# Patient Record
Sex: MALE | Race: WHITE | NOT HISPANIC OR LATINO | Employment: UNEMPLOYED | URBAN - METROPOLITAN AREA
[De-identification: names, ages, dates, MRNs, and addresses within clinical notes are randomized per-mention and may not be internally consistent; named-entity substitution may affect disease eponyms.]

---

## 2020-10-13 ENCOUNTER — APPOINTMENT (OUTPATIENT)
Dept: CT IMAGING | Facility: HOSPITAL | Age: 45
End: 2020-10-13
Payer: COMMERCIAL

## 2020-10-13 ENCOUNTER — HOSPITAL ENCOUNTER (EMERGENCY)
Facility: HOSPITAL | Age: 45
Discharge: HOME | End: 2020-10-14
Attending: STUDENT IN AN ORGANIZED HEALTH CARE EDUCATION/TRAINING PROGRAM
Payer: COMMERCIAL

## 2020-10-13 VITALS
HEIGHT: 73 IN | TEMPERATURE: 98.6 F | RESPIRATION RATE: 18 BRPM | BODY MASS INDEX: 22.7 KG/M2 | SYSTOLIC BLOOD PRESSURE: 123 MMHG | WEIGHT: 171.3 LBS | OXYGEN SATURATION: 92 % | DIASTOLIC BLOOD PRESSURE: 71 MMHG | HEART RATE: 73 BPM

## 2020-10-13 DIAGNOSIS — R10.32 LEFT LOWER QUADRANT ABDOMINAL PAIN: Primary | ICD-10-CM

## 2020-10-13 LAB
ALBUMIN SERPL-MCNC: 4.7 G/DL (ref 3.5–5.3)
ALP SERPL-CCNC: 74 U/L (ref 45–115)
ALT SERPL-CCNC: 16 U/L (ref 7–52)
ANION GAP SERPL CALC-SCNC: 8 MMOL/L (ref 3–11)
AST SERPL-CCNC: 27 U/L
BACTERIA #/AREA URNS AUTO: NORMAL /HPF
BASOPHILS # BLD AUTO: 0.1 10*3/UL
BASOPHILS NFR BLD AUTO: 1 % (ref 0–2)
BILIRUB SERPL-MCNC: 0.36 MG/DL (ref 0.2–1.4)
BILIRUB UR QL STRIP.AUTO: NEGATIVE
BUN SERPL-MCNC: 22 MG/DL (ref 7–25)
CALCIUM ALBUM COR SERPL-MCNC: 9.4 MG/DL (ref 8.6–10.3)
CALCIUM SERPL-MCNC: 10 MG/DL (ref 8.6–10.3)
CHLORIDE SERPL-SCNC: 105 MMOL/L (ref 98–107)
CLARITY UR: CLEAR
CO2 SERPL-SCNC: 25 MMOL/L (ref 21–32)
COLOR UR: NORMAL
CREAT SERPL-MCNC: 0.83 MG/DL (ref 0.7–1.3)
EOSINOPHIL # BLD AUTO: 0.2 10*3/UL
EOSINOPHIL NFR BLD AUTO: 3 % (ref 0–3)
ERYTHROCYTE [DISTWIDTH] IN BLOOD BY AUTOMATED COUNT: 12.7 % (ref 11.5–15)
GFR SERPL CREATININE-BSD FRML MDRD: 106 ML/MIN/1.73M*2
GLUCOSE SERPL-MCNC: 88 MG/DL (ref 70–105)
GLUCOSE UR STRIP.AUTO-MCNC: NEGATIVE MG/DL
HCT VFR BLD AUTO: 42.6 % (ref 38–50)
HGB BLD-MCNC: 15.2 G/DL (ref 13.2–17.2)
HGB UR QL STRIP.AUTO: NEGATIVE
KETONES UR STRIP.AUTO-MCNC: NEGATIVE MG/DL
LEUKOCYTE ESTERASE UR QL STRIP: NEGATIVE
LYMPHOCYTES # BLD AUTO: 3.1 10*3/UL
LYMPHOCYTES NFR BLD AUTO: 39 % (ref 15–47)
MCH RBC QN AUTO: 29.7 PG (ref 29–34)
MCHC RBC AUTO-ENTMCNC: 35.8 G/DL (ref 32–36)
MCV RBC AUTO: 83.1 FL (ref 82–97)
MONOCYTES # BLD AUTO: 0.6 10*3/UL
MONOCYTES NFR BLD AUTO: 7 % (ref 5–13)
NEUTROPHILS # BLD AUTO: 4 10*3/UL
NEUTROPHILS NFR BLD AUTO: 50 % (ref 46–70)
NITRITE UR QL STRIP.AUTO: NEGATIVE
PH UR STRIP.AUTO: 7 PH
PLATELET # BLD AUTO: 261 10*3/UL (ref 130–350)
PMV BLD AUTO: 7.6 FL (ref 6.9–10.8)
POTASSIUM SERPL-SCNC: 3.9 MMOL/L (ref 3.5–5.1)
PROT SERPL-MCNC: 6.7 G/DL (ref 6–8.3)
PROT UR STRIP.AUTO-MCNC: NEGATIVE MG/DL
RBC # BLD AUTO: 5.12 10*6/ΜL (ref 4.1–5.8)
RBC #/AREA URNS AUTO: NORMAL /HPF
SODIUM SERPL-SCNC: 138 MMOL/L (ref 135–145)
SP GR UR STRIP.AUTO: 1.01 (ref 1–1.03)
SQUAMOUS #/AREA URNS AUTO: NEGATIVE /HPF
UROBILINOGEN UR STRIP.AUTO-MCNC: <2 E.U./DL
WBC # BLD AUTO: 8 10*3/UL (ref 3.7–9.6)
WBC #/AREA URNS AUTO: NORMAL /HPF

## 2020-10-13 PROCEDURE — 80053 COMPREHEN METABOLIC PANEL: CPT | Performed by: STUDENT IN AN ORGANIZED HEALTH CARE EDUCATION/TRAINING PROGRAM

## 2020-10-13 PROCEDURE — 74176 CT ABD & PELVIS W/O CONTRAST: CPT | Mod: MG

## 2020-10-13 PROCEDURE — 36415 COLL VENOUS BLD VENIPUNCTURE: CPT | Performed by: STUDENT IN AN ORGANIZED HEALTH CARE EDUCATION/TRAINING PROGRAM

## 2020-10-13 PROCEDURE — 99284 EMERGENCY DEPT VISIT MOD MDM: CPT | Performed by: STUDENT IN AN ORGANIZED HEALTH CARE EDUCATION/TRAINING PROGRAM

## 2020-10-13 PROCEDURE — 85025 COMPLETE CBC W/AUTO DIFF WBC: CPT | Performed by: STUDENT IN AN ORGANIZED HEALTH CARE EDUCATION/TRAINING PROGRAM

## 2020-10-13 PROCEDURE — 81001 URINALYSIS AUTO W/SCOPE: CPT | Performed by: STUDENT IN AN ORGANIZED HEALTH CARE EDUCATION/TRAINING PROGRAM

## 2020-10-13 RX ORDER — BACLOFEN 10 MG/1
10 TABLET ORAL 3 TIMES DAILY
COMMUNITY

## 2020-10-13 RX ORDER — HYDROMORPHONE HYDROCHLORIDE 8 MG/1
8 TABLET ORAL EVERY 6 HOURS PRN
COMMUNITY

## 2020-10-13 ASSESSMENT — PAIN DESCRIPTION - DESCRIPTORS
DESCRIPTORS: STABBING
DESCRIPTORS: SHARP;STABBING

## 2020-10-14 NOTE — ED PROVIDER NOTES
Morning radiology over reads show a change in the report.  There is a small hypodensity in the liver.  This is not appear to be emergent by nursing staff call patient have outpatient follow-up.           Amarilys Hodge MD  10/14/20 1100

## 2020-10-14 NOTE — DISCHARGE INSTRUCTIONS
You were evaluated for abdominal pain in the emergency department.  Your urinalysis does not show any signs of infection or blood in your urine.  Your CT scan does not show any evidence of a kidney stone.  You do have a slight fecal impaction which could be causing your pain.  I recommend using stool softeners which can be obtained over-the-counter at the pharmacy or drinking prune juice.  Stay well-hydrated at home.  Continue taking all other medications as prescribed.  Follow-up with your primary care physician when you return home to [place]  Return to the emergency department for any new or worsening symptoms as needed.

## 2020-10-14 NOTE — ED PROVIDER NOTES
Chief Complaint   Patient presents with   • Abdominal Pain     LLQ Intermittent sharp stabbing pain, started yesterday. Has not noticed anything that exacerbates or relieves the pain.           HPI:    Patient is a 44 y.o. male who presents with left lower quadrant abdominal pain.  He states yesterday he had onset of sharp, stabbing left lower quadrant abdominal pain.  He states he will last few seconds at a time, but states it is becoming more frequent and severe today.  On exam he rates 7/10 brief intermittent, left lower quadrant sharp abdominal pain with no radiation.  Denies testicular pain.  Denies dysuria or hematuria.  Denies diarrhea or constipation.  States his last bowel movement was this morning and described as a well formed, nonbloody stool.  He takes Dilaudid at home for chronic back pain.  He is unable to identify any exacerbating or alleviating factors.  He denies fever, chills, sweats, nausea, vomiting.        No past medical history on file.    Past Surgical History:   Procedure Laterality Date   • BACK SURGERY         Social History     Socioeconomic History   • Marital status: Single     Spouse name: Not on file   • Number of children: Not on file   • Years of education: Not on file   • Highest education level: Not on file   Occupational History   • Not on file   Social Needs   • Financial resource strain: Not on file   • Food insecurity     Worry: Not on file     Inability: Not on file   • Transportation needs     Medical: Not on file     Non-medical: Not on file   Tobacco Use   • Smoking status: Never Smoker   • Smokeless tobacco: Never Used   Substance and Sexual Activity   • Alcohol use: Not on file   • Drug use: Not on file   • Sexual activity: Not on file   Lifestyle   • Physical activity     Days per week: Not on file     Minutes per session: Not on file   • Stress: Not on file   Relationships   • Social connections     Talks on phone: Not on file     Gets together: Not on file      Attends Christianity service: Not on file     Active member of club or organization: Not on file     Attends meetings of clubs or organizations: Not on file     Relationship status: Not on file   • Intimate partner violence     Fear of current or ex partner: Not on file     Emotionally abused: Not on file     Physically abused: Not on file     Forced sexual activity: Not on file   Other Topics Concern   • Not on file   Social History Narrative   • Not on file       No family history on file.    Allergies   Allergen Reactions   • Clindamycin Hives         Current Outpatient Medications:   •  baclofen (LIORESAL) 10 mg tablet, Take 10 mg by mouth 3 (three) times a day Indications: muscle spasms caused by a spinal disease, Disp: , Rfl:   •  HYDROmorphone (Dilaudid) 8 mg tablet, Take 8 mg by mouth every 6 (six) hours as needed for pain scale 8-10/10, Disp: , Rfl:       ROS:  Constitutional: Denies fever  Eyes: Denies eye pain or discharge  ENT: Denies sore throat. Denies congestion  Cardiovascular: Denies chest pain.    Respiratory: Denies cough. Denies hemoptysis  GI: Abdominal pain.  Denies nausea or vomiting.  Denies diarrhea.  : Denies dysuria.  Denies hematuria.  Musculoskeletal: Denies back pain  Neuro: Denies headache.  Denies peripheral numbness or tingling.  Hematology: Denies easy bleeding or bruising  Dermatology: Denies rash      ED Triage Vitals   Temp Heart Rate Resp BP SpO2   10/13/20 2006 10/13/20 2006 10/13/20 2006 10/13/20 2006 10/13/20 2006   37 °C (98.6 °F) 66 20 122/72 96 %      Temp Source Heart Rate Source Patient Position BP Location FiO2 (%)   10/13/20 2006 -- 10/13/20 2117 -- --   Oral  Head of bed 30 degrees or higher           Physical Exam:  Nursing note and vitals reviewed.  Constitutional: appears well-developed.   Head: Normocephalic and atraumatic.   Eyes: Pupils are equal, round, and reactive to light.  Extraocular movements intact.  Neck: Supple, full range of motion.  Cardiovascular:  Regular rate and rhythm.  No murmur, rub, or gallop.  Normal pulses.  Normal peripheral perfusion. No peripheral edema  Pulmonary/Chest: No respiratory distress.  Clear to auscultation bilaterally.  No chest wall tenderness.  Abdominal: Soft and nontender.  Well-healed horizontal surgical scar.  No mass.  : Chaperoned by Deja LUNA ED RN -normal external genitalia, no testicular swelling or tenderness, no scrotal erythema or edema, no lesions, no penile discharge, no hernia defect or mass bilaterally.  Back: No CVA tenderness.  No midline tenderness.  Full range of motion.  Well-healed surgical scars.  Musculoskeletal: No swelling or deformity.  Full range of motion of bilateral upper and lower extremities.  Neurological: Alert.  CN II-XII intact.  Motor and sensation intact and equal throughout.  No focal neurologic deficits.  Skin: Skin is warm and dry. No rash noted.   Psychiatric: Normal mood and affect.           Labs:  Labs Reviewed   COMPREHENSIVE METABOLIC PANEL - Normal       Result Value    Sodium 138      Potassium 3.9      Chloride 105      CO2 25      Anion Gap 8      BUN 22      Creatinine 0.83      Glucose 88      Calcium 10.0      AST 27      ALT (SGPT) 16      Alkaline Phosphatase 74      Total Protein 6.7      Albumin 4.7      Total Bilirubin 0.36      eGFR 106      Corrected Calcium 9.4      Narrative:     Estimated GFR calculated using the 2009 CKD-EPI creatinine equation.   URINALYSIS, MICROSCOPIC ONLY - Normal    RBC, Urine 0-2      WBC, Urine 0-4      Squamous Epithelial, Urine Negative      Bacteria, Urine None seen     URINALYSIS, DIPSTICK ONLY, FOR USE WITH MICROSCOPIC PANEL - Normal    Color, Urine Straw      Clarity, Urine Clear      Specific Gravity, Urine 1.010      Leukocytes, Urine Negative      Nitrite, Urine Negative      Protein, Urine Negative      Ketones, Urine Negative      Urobilinogen, Urine <2.0      Bilirubin, Urine Negative      Blood, Urine Negative      Glucose, Urine  Negative      pH, Urine 7.0     CBC WITH AUTO DIFFERENTIAL    WBC 8.0      RBC 5.12      Hemoglobin 15.2      Hematocrit 42.6      MCV 83.1      MCH 29.7      MCHC 35.8      RDW 12.7      Platelets 261      MPV 7.6      Neutrophils% 50      Lymphocytes% 39      Monocytes% 7      Eosinophils% 3      Basophils% 1      Neutrophils Absolute 4.00      Lymphocytes Absolute 3.10      Monocytes Absolute 0.60      Eosinophils Absolute 0.20      Basophils Absolute 0.10     URINALYSIS WITH MICROSCOPIC    Narrative:     The following orders were created for panel order Urinalysis w/microscopic Urine, Clean Catch.  Procedure                               Abnormality         Status                     ---------                               -----------         ------                     Urinalysis, microscopic U...[[id number]]  Normal              Final result               Urinalysis, dipstick Urin...[[id number]]  Normal              Final result                 Please view results for these tests on the individual orders.         Imaging:  CT abdomen pelvis renal stone protocol   Preliminary Result   Impression:   1.  No renal calculi or hydronephrosis.   2.  Mild-to-moderate fecal retention.                MDM:    Patient presents with intermittent fleeting left lower quadrant abdominal pain, concerning for possible nephrolithiasis versus possible musculoskeletal pain with spasm.  No evidence of hernia on examination.  Patient afebrile and hemodynamically acceptable on presentation  UA with no evidence of hematuria or UTI  CT with moderate fecal retention, possibly secondary to constipation from chronic Dilaudid use.  No evidence of nephro or ureterolithiasis.  Labs including CBC and chemistry unremarkable.  Discharge home with education to use prune juice or stool softeners for constipation and fecal retention as this could be the source of his pain.  Educated to follow-up with his primary care physician when he returns home to  [place]  Educated to return to the emergency department as needed for any new or worsening symptoms  Patient verbalizes comfort and understanding with discharge plan.    Given         ED Course as of Oct 14 0359   Tue Oct 13, 2020   2207 Patient requested to leave the emergency department AGAINST MEDICAL ADVICE.    [MF]   2229 Patient now agreeable to stay for evaluation.    [MF]      ED Course User Index  [MF] Hubert Tucker MD         Procedures        Clinical Impression:    Left lower quadrant abdominal pain  Fecal retention  Chronic opiate use      A voice recognition program was used to aid in documentation of this record.  Sometimes words are not printed exactly as they were spoken.  While efforts were made to carefully edit and correct any inaccuracies, some areas may be present; please take these into context.  Please contact the provider if areas are identified.     Hubert Tucker MD  10/14/20 1072